# Patient Record
Sex: MALE | Race: WHITE | Employment: STUDENT | URBAN - METROPOLITAN AREA
[De-identification: names, ages, dates, MRNs, and addresses within clinical notes are randomized per-mention and may not be internally consistent; named-entity substitution may affect disease eponyms.]

---
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
## 2025-04-28 ENCOUNTER — TELEPHONE (OUTPATIENT)
Dept: FAMILY MEDICINE CLINIC | Facility: CLINIC | Age: 20
End: 2025-04-28

## 2025-04-28 NOTE — TELEPHONE ENCOUNTER
Spoke with patient's father in regards to today's visit. I called to try to reschedule patient's appointment due to needing 30 mins instead of 15 since he is a new patient.     Patient's father (Raiza) states that the patient has not seen a provider in years, was seen in ER for stomach pain, anxiety, and OCD. Would like to be seen by Dr. Carrasquillo as soon as possible due to the pain and recent lab results from ER.

## 2025-07-26 ENCOUNTER — HOSPITAL ENCOUNTER (EMERGENCY)
Facility: HOSPITAL | Age: 20
Discharge: HOME | End: 2025-07-26
Attending: PEDIATRICS

## 2025-07-26 ENCOUNTER — APPOINTMENT (OUTPATIENT)
Dept: CT IMAGING | Facility: HOSPITAL | Age: 20
End: 2025-07-26
Attending: PEDIATRICS

## 2025-07-26 ENCOUNTER — APPOINTMENT (OUTPATIENT)
Dept: ULTRASOUND IMAGING | Facility: HOSPITAL | Age: 20
End: 2025-07-26
Attending: PEDIATRICS

## 2025-07-26 VITALS
TEMPERATURE: 98 F | WEIGHT: 220 LBS | SYSTOLIC BLOOD PRESSURE: 141 MMHG | BODY MASS INDEX: 30.8 KG/M2 | RESPIRATION RATE: 28 BRPM | HEIGHT: 71 IN | DIASTOLIC BLOOD PRESSURE: 78 MMHG | OXYGEN SATURATION: 100 % | HEART RATE: 117 BPM

## 2025-07-26 DIAGNOSIS — K85.20 ALCOHOL-INDUCED ACUTE PANCREATITIS WITHOUT INFECTION OR NECROSIS (HCC): ICD-10-CM

## 2025-07-26 DIAGNOSIS — R10.9 ABDOMINAL PAIN, ACUTE: Primary | ICD-10-CM

## 2025-07-26 LAB
ALBUMIN SERPL-MCNC: 4 G/DL (ref 3.2–4.8)
ALBUMIN/GLOB SERPL: 1.3 (ref 1–2)
ALP LIVER SERPL-CCNC: 116 U/L (ref 45–117)
ALT SERPL-CCNC: 422 U/L (ref 10–49)
AMYLASE SERPL-CCNC: 60 U/L (ref 30–118)
ANION GAP SERPL CALC-SCNC: 14 MMOL/L (ref 0–18)
AST SERPL-CCNC: 1362 U/L (ref ?–34)
BASOPHILS # BLD AUTO: 0.06 X10(3) UL (ref 0–0.2)
BASOPHILS NFR BLD AUTO: 0.8 %
BILIRUB SERPL-MCNC: 2 MG/DL (ref 0.3–1.2)
BUN BLD-MCNC: 13 MG/DL (ref 9–23)
CALCIUM BLD-MCNC: 8.7 MG/DL (ref 8.7–10.6)
CHLORIDE SERPL-SCNC: 96 MMOL/L (ref 98–112)
CO2 SERPL-SCNC: 25 MMOL/L (ref 21–32)
CREAT BLD-MCNC: 0.83 MG/DL (ref 0.7–1.3)
EGFRCR SERPLBLD CKD-EPI 2021: 128 ML/MIN/1.73M2 (ref 60–?)
EOSINOPHIL # BLD AUTO: 0.04 X10(3) UL (ref 0–0.7)
EOSINOPHIL NFR BLD AUTO: 0.5 %
ERYTHROCYTE [DISTWIDTH] IN BLOOD BY AUTOMATED COUNT: 12.4 %
GLOBULIN PLAS-MCNC: 3.1 G/DL (ref 2–3.5)
GLUCOSE BLD-MCNC: 140 MG/DL (ref 70–99)
HCT VFR BLD AUTO: 41.3 % (ref 39–53)
HGB BLD-MCNC: 15.3 G/DL (ref 13–17.5)
IMM GRANULOCYTES # BLD AUTO: 0.03 X10(3) UL (ref 0–1)
IMM GRANULOCYTES NFR BLD: 0.4 %
LIPASE SERPL-CCNC: 174 U/L (ref 12–53)
LYMPHOCYTES # BLD AUTO: 1.18 X10(3) UL (ref 1–4)
LYMPHOCYTES NFR BLD AUTO: 15.1 %
MCH RBC QN AUTO: 35 PG (ref 26–34)
MCHC RBC AUTO-ENTMCNC: 37 G/DL (ref 31–37)
MCV RBC AUTO: 94.5 FL (ref 80–100)
MONOCYTES # BLD AUTO: 0.52 X10(3) UL (ref 0.1–1)
MONOCYTES NFR BLD AUTO: 6.6 %
NEUTROPHILS # BLD AUTO: 6.01 X10 (3) UL (ref 1.5–7.7)
NEUTROPHILS # BLD AUTO: 6.01 X10(3) UL (ref 1.5–7.7)
NEUTROPHILS NFR BLD AUTO: 76.6 %
OSMOLALITY SERPL CALC.SUM OF ELEC: 282 MOSM/KG (ref 275–295)
PLATELET # BLD AUTO: 244 10(3)UL (ref 150–450)
POTASSIUM SERPL-SCNC: 3.6 MMOL/L (ref 3.5–5.1)
PROT SERPL-MCNC: 7.1 G/DL (ref 5.7–8.2)
RBC # BLD AUTO: 4.37 X10(6)UL (ref 4.3–5.7)
SODIUM SERPL-SCNC: 135 MMOL/L (ref 136–145)
WBC # BLD AUTO: 7.8 X10(3) UL (ref 4–11)

## 2025-07-26 PROCEDURE — 74177 CT ABD & PELVIS W/CONTRAST: CPT | Performed by: PEDIATRICS

## 2025-07-26 PROCEDURE — 83690 ASSAY OF LIPASE: CPT | Performed by: PEDIATRICS

## 2025-07-26 PROCEDURE — 96361 HYDRATE IV INFUSION ADD-ON: CPT

## 2025-07-26 PROCEDURE — 76700 US EXAM ABDOM COMPLETE: CPT | Performed by: PEDIATRICS

## 2025-07-26 PROCEDURE — 96375 TX/PRO/DX INJ NEW DRUG ADDON: CPT

## 2025-07-26 PROCEDURE — 96376 TX/PRO/DX INJ SAME DRUG ADON: CPT

## 2025-07-26 PROCEDURE — 99284 EMERGENCY DEPT VISIT MOD MDM: CPT

## 2025-07-26 PROCEDURE — 99285 EMERGENCY DEPT VISIT HI MDM: CPT

## 2025-07-26 PROCEDURE — 82150 ASSAY OF AMYLASE: CPT | Performed by: PEDIATRICS

## 2025-07-26 PROCEDURE — 85025 COMPLETE CBC W/AUTO DIFF WBC: CPT | Performed by: PEDIATRICS

## 2025-07-26 PROCEDURE — 96374 THER/PROPH/DIAG INJ IV PUSH: CPT

## 2025-07-26 PROCEDURE — 80053 COMPREHEN METABOLIC PANEL: CPT | Performed by: PEDIATRICS

## 2025-07-26 RX ORDER — ONDANSETRON 4 MG/1
4 TABLET, ORALLY DISINTEGRATING ORAL EVERY 4 HOURS PRN
Qty: 10 TABLET | Refills: 0 | Status: SHIPPED | OUTPATIENT
Start: 2025-07-26 | End: 2025-07-26

## 2025-07-26 RX ORDER — ONDANSETRON 4 MG/1
4 TABLET, ORALLY DISINTEGRATING ORAL EVERY 6 HOURS PRN
Qty: 5 TABLET | Refills: 0 | Status: SHIPPED | OUTPATIENT
Start: 2025-07-26

## 2025-07-26 RX ORDER — MORPHINE SULFATE 4 MG/ML
4 INJECTION, SOLUTION INTRAMUSCULAR; INTRAVENOUS ONCE
Status: COMPLETED | OUTPATIENT
Start: 2025-07-26 | End: 2025-07-26

## 2025-07-26 RX ORDER — ONDANSETRON 2 MG/ML
4 INJECTION INTRAMUSCULAR; INTRAVENOUS ONCE
Status: COMPLETED | OUTPATIENT
Start: 2025-07-26 | End: 2025-07-26

## 2025-07-26 RX ORDER — HYDROCODONE BITARTRATE AND ACETAMINOPHEN 5; 325 MG/1; MG/1
1 TABLET ORAL EVERY 4 HOURS PRN
Qty: 10 TABLET | Refills: 0 | Status: SHIPPED | OUTPATIENT
Start: 2025-07-26

## 2025-07-26 RX ORDER — MORPHINE SULFATE 2 MG/ML
2 INJECTION, SOLUTION INTRAMUSCULAR; INTRAVENOUS ONCE
Status: COMPLETED | OUTPATIENT
Start: 2025-07-26 | End: 2025-07-26

## 2025-07-26 RX ORDER — HYDROCODONE BITARTRATE AND ACETAMINOPHEN 5; 325 MG/1; MG/1
1 TABLET ORAL EVERY 4 HOURS PRN
Qty: 10 TABLET | Refills: 0 | Status: SHIPPED | OUTPATIENT
Start: 2025-07-26 | End: 2025-07-26

## 2025-07-26 NOTE — ED QUICK NOTES
Family/Father is at the bedside providing comfort care and emotional support to the patient/father. No new needs or concerns expressed by the family at this time. Environment is calm and supportive. Will continue to monitor and provide supportive care as appropriate.

## 2025-07-26 NOTE — ED PROVIDER NOTES
Patient Seen in: [facility] Emergency Department        History  Chief Complaint   Patient presents with    Abdomen/Flank Pain     Stated Complaint: abd pain    Subjective:   HPI    Patient is a 19-year-old male here with complaint of some left upper quadrant pain and left epigastric pain.  Symptoms present when he woke up this morning.  He has had some nausea and vomiting yesterday.  He does state that he has been drinking alcohol frequently.  His dad reports that he has had chronic abdominal pain for about a year      Objective:     Past Medical History:    Anxiety    Depression              History reviewed. No pertinent surgical history.             Social History     Socioeconomic History    Marital status: Single   Tobacco Use    Smoking status: Never     Passive exposure: Never    Smokeless tobacco: Never   Vaping Use    Vaping status: Never Used   Substance and Sexual Activity    Alcohol use: Yes     Alcohol/week: 70.0 standard drinks of alcohol     Types: 70 Shots of liquor per week     Comment: Daily                                Physical Exam    ED Triage Vitals [07/26/25 1522]   /86   Pulse (!) 125   Resp 18   Temp 97.9 °F (36.6 °C)   Temp src Oral   SpO2 98 %   O2 Device None (Room air)       Current Vitals:   Vital Signs  BP: 141/78  Pulse: 117  Resp: (!) 28  Temp: 97.9 °F (36.6 °C)  Temp src: Oral  MAP (mmHg): (!) 102    Oxygen Therapy  SpO2: 100 %  O2 Device: None (Room air)            Physical Exam  HEENT: The pupils are equal round and react to light, oropharynx is clear, mucous membranes are moist.  Ears:left TM shows no erythema, right TM shows no erythema   Neck: Supple, full range of motion.  CV: Chest is clear to auscultation, no wheezes rales or rhonchi.  Cardiac exam normal S1-S2, no murmurs rubs or gallops.  Abdomen: Soft, minimally tender to palpation of the mid epigastric area and left upper quadrant area nondistended.  Bowel sounds present throughout.  Extremities: Warm and  well perfused.  Dermatologic exam: No rashes or lesions.  Neurologic exam: Cranial nerves 2-12 grossly intact.    Orthopedic exam: normal,from.        ED Course  Labs Reviewed   COMP METABOLIC PANEL (14) - Abnormal; Notable for the following components:       Result Value    Glucose 140 (*)     Sodium 135 (*)     Chloride 96 (*)     AST 1,362 (*)      (*)     Bilirubin, Total 2.0 (*)     All other components within normal limits   CBC WITH DIFFERENTIAL WITH PLATELET - Abnormal; Notable for the following components:    MCH 35.0 (*)     All other components within normal limits   LIPASE - Abnormal; Notable for the following components:    Lipase 174 (*)     All other components within normal limits   AMYLASE - Normal   RAINBOW DRAW LAVENDER   RAINBOW DRAW LIGHT GREEN       ED Course as of 07/27/25 1710  ------------------------------------------------------------  Time: 07/26 1758  Value: US ABDOMEN COMPLETE (CPT=76700)  Comment: (Reviewed)  ------------------------------------------------------------  Time: 07/26 1758  Comment: Ultrasound noted fatty infiltration of liver, pancreas unable to visualize.  Lipase 174 indicative of pancreatitis.  He does state he drinks alcohol frequently.  Father is very concerned because he has had chronic abdominal pain over a year.  Father is requesting CT which I am comfortable obtaining.  Will administer morphine due to continued pain, 8/10 and closely monitor and reassess  ------------------------------------------------------------  Time: 07/26 1915  Comment: CT noted acute pancreatitis, fatty liver.  Patient states his pain is still an 8/10 after morphine.  Recommended hospitalization.  Initially agreed however later wanted to be discharged home.  Prescription for Norco written.  He understands that if pain is not any better at home, to return to the ER.  Follow-up with GI     Radiology:  Imaging ordered independently visualized and interpreted by myself (along with  review of radiologist's interpretation) and noted the following: Ultrasound ordered.  Pending at time of signout    CT ABDOMEN+PELVIS(CONTRAST ONLY)(CPT=74177)  Result Date: 7/26/2025  CONCLUSION: 1. Mild to moderate acute pancreatitis involving the body and tail of pancreas without necrosis. Mild ileus. Details above. 2. Severe hepatic steatosis with hepatomegaly. Borderline splenomegaly. Mildly enlarged peripancreatic head lymph nodes the largest measures 1 cm. Electronically Verified and Signed by Attending Radiologist: Shamir Carr MD 7/26/2025 6:57 PM Workstation: [employer]    US ABDOMEN COMPLETE (CPT=76700)  Result Date: 7/26/2025  CONCLUSION: No acute disease. Fatty filtration of the liver. Nonvisualization of the pancreas due to bowel gas. Electronically Verified and Signed by Attending Radiologist: Shamir Carr MD 7/26/2025 5:39 PM Workstation: [employer]      Labs:  ^^ Personally ordered, reviewed, and interpreted all unique tests ordered.  Clinically significant labs noted: Elevated lipase suggestive of early or mild pancreatitis    Medications administered:  Medications   ondansetron (Zofran) 4 MG/2ML injection 4 mg (4 mg Intravenous Given 7/26/25 1536)   sodium chloride 0.9 % IV bolus 1,000 mL (0 mL Intravenous Stopped 7/26/25 1636)   ondansetron (Zofran) 4 MG/2ML injection 4 mg (4 mg Intravenous Given 7/26/25 1651)   morphINE PF 2 MG/ML injection 2 mg (2 mg Intravenous Given 7/26/25 1652)   morphINE PF 4 MG/ML injection 4 mg (4 mg Intravenous Given 7/26/25 1802)   iopamidol 76% (ISOVUE-370) injection for power injector (100 mL Intravenous Given 7/26/25 1838)       Pulse oximetry:  Pulse oximetry on room air is 98% and is normal.     Cardiac monitoring:  Initial heart rate is 117 and is normal for age    Vital signs:  Vitals:    07/26/25 1522 07/26/25 1815 07/26/25 1830 07/26/25 1919   BP: 135/86   141/78   Pulse: (!) 125 110 120 117   Resp: 18 (!) 28 26 (!) 28   Temp: 97.9 °F (36.6 °C)       TempSrc: Oral      SpO2: 98% 100% 100% 100%   Weight: 99.8 kg      Height: 180.3 cm (5' 11\")          Chart review:  ^^ Review of prior external notes from unique sources (non-[facility] ED records): noted in history previous visits for anxiety and routine care reviewed                     MDM     patient presents with some chronic abdominal pain.  Differential considered includes alcohol induced pancreatitis, viral illness, gastritis.  At this time patient's final disposition will be signed out to my colleague.        Medical Decision Making      Disposition and Plan     Clinical Impression:  1. Abdominal pain, acute    2. Alcohol-induced acute pancreatitis without infection or necrosis (HCC)         Disposition:  Discharge  7/26/2025  7:15 pm    Follow-up:  Nomi Quevedo MD  [address] [place] [address]  [phone]    Follow up            Medications Prescribed:  Discharge Medication List as of 7/26/2025  7:32 PM                Supplementary Documentation:

## (undated) NOTE — ED AVS SNAPSHOT
[facility] Emergency Department    [place]  [address]    [place] [address]    Phone:  [phone]    Fax:  Elan Villalta   MRN: [id number]    Department:  [facility] Emergency Department   Date of Visit:  2/12/ coverage for follow-up care and referrals. [address] [facility] [employer] [place] [phone]  Pediatric [phone] Emergency Department   [phone]       To Check ER Wait Times:  TEXT 'ERwait' to 13163      Click [email or web address] will be contacted. Please make sure we have your correct phone number before you leave. After you leave, you should follow the attached instructions. I have read and understand the instructions given to me by my caregivers.         [facility] Sign up for [employer] access for your child. Smart Devices access allows you to view health information for your child from their recent   visit, view other health information and more.   To sign up or find more information on getting   Proxy Access to your child

## (undated) NOTE — ED AVS SNAPSHOT
[facility] Emergency Department    [place]  [address]    [place] [address]    Phone:  [phone]    Fax:  Elan Villalta   MRN: [id number]    Department:  [facility] Emergency Department   Date of Visit:  2/12/ IF THERE IS ANY CHANGE OR WORSENING OF YOUR CONDITION, CALL YOUR PRIMARY CARE PHYSICIAN AT ONCE OR RETURN IMMEDIATELY TO THE EMERGENCY DEPARTMENT.     If you have been prescribed any medication(s), please fill your prescription right away and begin taking t

## (undated) NOTE — ED AVS SNAPSHOT
Parent/Legal Guardian Access to the Online [employer] Record of a Patient 15to 16Years Old  Return completed form by Secure email to [facility]/Medical Records Department: [email or web address]     Requirements and Procedures   Under [facility] MyChart ID and password with another person, that person may be able to view my or my child’s health information, and health information about someone who has authorized me as a MyChart proxy.    ·  I agree that it is my responsibility to select a confident Sign-Up Form and I agree to its terms.        Authorization Form     Please enter Patient’s information below:   Name (last, first, middle initial) __________________________________________   Gender  Male  Female    Last 4 Digits of Social Security Number Parent/Legal Guardian Signature                                  For Patient (1417 years of age)  I agree to allow my parent/legal guardian, named above, online access to my medical information currently available and that may become available as a result